# Patient Record
(demographics unavailable — no encounter records)

---

## 2024-10-08 NOTE — HISTORY OF PRESENT ILLNESS
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 Being followed by , Dr. Gilliland and Vascular, . Performing LWC with betadine paint intermittently, cleaning with soap and water.  Pt now presents for evaluation of new right ring finger swelling, pain and mild drainage over the last 3 days. Pt had a later appt however was recommended to f/u asap after going to hand OT earlier this week. Pt denies any fever/chills or systemic symptoms. Reports RRF was spontaneously bleeding this week, denies purulent drainage.   Here with both daughters  Non smoker No h/o DM Not on any anticoagulation  Interval hx (4/16/24). Pt here for f/u accompanied by daughter. Doing well with improvement in healing. BL hand X-Ray with diffuse OA, no evidence of OM. Taking oral antibiotics, stopped 3 days ago due to GI discomfort. Denies any f/c or purulent drainage. Performing daily Betadine paint.   Interval hx (7/25/24). Pt here for f/u accompanied by daughter. No significant change. Offers no new complaints. Applying daily Betadine to ischemic digits. Denies any f/c or purulent drainage or gangrene.   Interval hx (9/6/24). Pt here for f/u to necrotic fingertips accompanied by daughter. Recently admitted to [facility] for UTI, on oral antibiotics for 2 more days. Noticed purulent drainage and increasing pain left middle fingertip for 2 days. Left index and right 4th finger dry gangrene stable. Denies any fever or chills.   Interval hx (9/25/24) : POD#7 s/p amputation of L index distal phalax, L 3rd finger distal phalax & R 4th finger distal phalax. Complaining of pain mainly in the L hand, took tramadol post op with relief for 2 days. Denies fever or drainage from the dressings.  Interval hx (10/2/24): POD#14 s/p amputation of L index distal phalax, L 3rd finger distal phalax & R 4th finger distal phalax. Complaining of pain to both fingers. Denies fever or drainage from the dressings

## 2024-10-08 NOTE — PHYSICAL EXAM
[de-identified] : Well developed, well nourished in NAD  [de-identified] : nonlabored breathing  [de-identified] : Right hand- warm with good ROM, RF with small open area of granulation tissue near amputation site, sutures in place, healing well, no drainage or purulent drainage, mild edema, mild tenderness.  Left hand - warm with good ROM, 2nd and 3rd digits with amputation site at distal phalax, sutures in place. 2nd digit with area of bright green purulence and blister, severe tenderness to touch, incision opening up with no purulence but yellow eschar present. 3rd digit healing well, incision closed with no drainage, mild tenderness

## 2024-10-08 NOTE — ASSESSMENT
[FreeTextEntry1] : 83 y/o [place] citizen-speaking female here for evaluation of bilateral hand pressor induced digital gangrene, improving and demarcating dry gangrene. Patient and daughter agreed to amputation   POD#14 s/p amputation of L index distal phalax, L 3rd finger distal phalax & R 4th finger distal phalax.   - sutures removed from all incisions - L 2nd finger wrapped with baci/tracin/xeroform/cling/coban. to stay until next visit. 2 sutures remain in place - R finger & L 3rd finger with bacitracin/xeroform/cling/coban. Change every other day  - 1 week augmentin sent for finger infection - plan for surgery with podiatry tomorrow for toe amputation - f/u 1 week with  for evaluation of L 2nd finger infection  10/8/2024 as above doing well finger fine changed abx to doxy based on sensitivities seen w daughter remaining sutures remvoed  f/u 2 wks

## 2024-10-10 NOTE — PHYSICAL EXAM
[General Appearance - Alert] : alert [Ankle Swelling (On Exam)] : not present [Ankle Swelling On The Right] : of the right ankle [Delayed in the Right Toes] : capillary refills normal in right toes [2+] : right foot dorsalis pedis 2+ [de-identified] : Normal ROM.  [FreeTextEntry1] : Suture sites intact at Right Hallux Distal Amputation site, no signs of dehiscence, minimal swellling [Sensation] : the sensory exam was normal to light touch and pinprick

## 2024-10-10 NOTE — ASSESSMENT
[FreeTextEntry1] : s/p  Right Distal Hallux Amputation  Questions and concerns were addressed.  New dressing of xeroform- Gauze- Abdominal Pad- kerlix- Ace bandage.  RTC next week.

## 2024-10-10 NOTE — HISTORY OF PRESENT ILLNESS
[FreeTextEntry1] : Patient presents to clinic for post-op follow up of Right Distal Hallux amputation with Tissue plasty done last week. Patient was assisted to clinic with a wheelchair. Patient came to clinic with post-op shoe.

## 2024-10-15 NOTE — HISTORY OF PRESENT ILLNESS
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 Being followed by , Dr. Gilliland and Vascular, . Performing LWC with betadine paint intermittently, cleaning with soap and water.  Pt now presents for evaluation of new right ring finger swelling, pain and mild drainage over the last 3 days. Pt had a later appt however was recommended to f/u asap after going to hand OT earlier this week. Pt denies any fever/chills or systemic symptoms. Reports RRF was spontaneously bleeding this week, denies purulent drainage.   Here with both daughters  Non smoker No h/o DM Not on any anticoagulation  Interval hx (4/16/24). Pt here for f/u accompanied by daughter. Doing well with improvement in healing. BL hand X-Ray with diffuse OA, no evidence of OM. Taking oral antibiotics, stopped 3 days ago due to GI discomfort. Denies any f/c or purulent drainage. Performing daily Betadine paint.   Interval hx (7/25/24). Pt here for f/u accompanied by daughter. No significant change. Offers no new complaints. Applying daily Betadine to ischemic digits. Denies any f/c or purulent drainage or gangrene.   Interval hx (9/6/24). Pt here for f/u to necrotic fingertips accompanied by daughter. Recently admitted to [facility] for UTI, on oral antibiotics for 2 more days. Noticed purulent drainage and increasing pain left middle fingertip for 2 days. Left index and right 4th finger dry gangrene stable. Denies any fever or chills.   Interval hx (9/25/24) : POD#7 s/p amputation of L index distal phalanx, L 3rd finger distal phalanx & R 4th finger distal phalanx. Complaining of pain mainly in the L hand, took tramadol post op with relief for 2 days. Denies fever or drainage from the dressings.  Interval hx (10/2/24): POD#14 s/p amputation of L index distal phalanx, L 3rd finger distal phalanx & R 4th finger distal phalanx. Complaining of pain to both fingers. Denies fever or drainage from the dressings  Interval hx (10/15/24): Pt presents today 4 weeks s/p distal amputation of L index, L middle & R 4th finger distal phalanx concerned with healing and pain. Completed all oral antibiotics. Denies any f/c or drainage.

## 2024-10-15 NOTE — DATA REVIEWED
[FreeTextEntry1] : Steven Accession Number : [id number] Patient:     REYNA WYATT   Accession:                             [id number]  Collected Date/Time:                   9/18/2024 11:22 EDT Received Date/Time:                    9/18/2024 13:57 EDT  Surgical Pathology Report - Auth (Verified)  Specimen(s) Submitted 1  Left  index distal phalanx 2  Left third finger distal phalanx 3  Right fourth finger distal phalanx  Final Diagnosis 1.  Left index distal phalanx, amputation: -  Gangrenous digit.   2.  Left third finger distal phalanx, amputation: -  Gangrenous digit.  3.  Right fourth finger distal phalanx, amputation: -  Gangrenous digit. Verified by: Krysten Juan M.D. (Electronic Signature) Reported on: 09/23/24 12:28 EDT, [facility], [address] [place] [address] Phone: [phone]   Fax: [phone]

## 2024-10-15 NOTE — PHYSICAL EXAM
[de-identified] : Well developed, well nourished in NAD  [de-identified] : nonlabored breathing  [de-identified] : Right hand- warm with good ROM, RF healing well, no drainage or purulent drainage, no erythema or open wounds Left hand - warm with good ROM, 2nd and 3rd digits with amputation stump at middle phalanx, 2nd digit with small dry eschar and slight skin discoloration, dorsal aspect with minor epidermolysis, no erythema or purulence, 3rd digit healing well, incision closed with no drainage, mild tenderness

## 2024-10-15 NOTE — ASSESSMENT
[FreeTextEntry1] : 83 y/o Romanian-speaking female here for evaluation of bilateral hand pressor induced digital gangrene, improving and demarcating dry gangrene. Patient and daughter agreed to amputation   Now 4 weeks s/p amputation of L index distal phalanx, L 3rd finger distal phalanx & R 4th finger distal phalanx. Doing well.   - daily Aquaphor to all amputation stumps - may shower, no submerging  - hand elevation  - all her questions were answered  - f/u 2 weeks

## 2024-10-18 NOTE — ASSESSMENT
[FreeTextEntry1] : Subjective:   - Summary: The patient presented for removal of sutures and wound care.   - Chief Complaint (CC): Suture removal and wound care.   - History of Present Illness (HPI): The patient had sutures that needed to be removed and required wound care.   - Past Medical History:   - Past Surgical History:   - Family History:   - Social History:   - Review of Systems:   - Medications:   - Allergies:   Objective:   - Diagnostic Results:   - Vital Signs:   - Physical Examination (PE): The patient had sutures that needed to be removed and a wound that required care.   Assessment:   - Summary: The patient required suture removal and wound care left foot   - Problems:     - Sutures requiring removal     - Wound care needed    Plan:   - Summary: The plan was to remove the sutures and provide wound care instructions.   - Plan:     - Suture removal left foot      - Wound care instructions given and discussed possible complications of amputation and tissue plasty     - Follow-up in 2 weeks to assess wound healing

## 2024-10-29 NOTE — DATA REVIEWED
[FreeTextEntry1] : Steven Accession Number : [id number] Patient:     REYNA WYATT   Accession:                             [id number]  Collected Date/Time:                   9/18/2024 11:22 EDT Received Date/Time:                    9/18/2024 13:57 EDT  Surgical Pathology Report - Auth (Verified)  Specimen(s) Submitted 1  Left  index distal phalanx 2  Left third finger distal phalanx 3  Right fourth finger distal phalanx  Final Diagnosis 1.  Left index distal phalanx, amputation: -  Gangrenous digit.   2.  Left third finger distal phalanx, amputation: -  Gangrenous digit.  3.  Right fourth finger distal phalanx, amputation: -  Gangrenous digit. Verified by: Krysten Juan M.D. (Electronic Signature) Reported on: 09/23/24 12:28 EDT, [facility], [address] [place] [address] Phone: [phone]   Fax: [phone]

## 2024-10-29 NOTE — PHYSICAL EXAM
[de-identified] : Well developed, well nourished in NAD  [de-identified] : nonlabored breathing  [de-identified] : Right hand- warm with good ROM, ring finger amputation stump healed, no erythema or purulent drainage, hypersensitive scar  Left hand - warm with good ROM, 2nd and 3rd digits with amputation stump at middle phalanx, 2nd digit with small dry eschar, no erythema or purulence, 3rd digit healed, no erythema, no drainage, hypersensitive scar

## 2024-10-29 NOTE — ASSESSMENT
[FreeTextEntry1] : 83 y/o Persian-speaking female here for evaluation of bilateral hand pressor induced digital gangrene, improving and demarcating dry gangrene. Patient and daughter agreed to amputation   Now 6 weeks s/p amputation of L index distal phalanx, L 3rd finger distal phalanx & R 4th finger distal phalanx. Doing well.   - daily Aquaphor to all amputation stumps - scar massage as demonstrated - Rx Gabapentin for pain PRN  - OHT for ROM and scar desensitization, referral given  - hand elevation  - all her questions were answered  - f/u 2 months with Dr. Arreaga.

## 2024-10-29 NOTE — HISTORY OF PRESENT ILLNESS
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 Being followed by , Dr. Gilliland and Vascular, . Performing LWC with betadine paint intermittently, cleaning with soap and water.  Pt now presents for evaluation of new right ring finger swelling, pain and mild drainage over the last 3 days. Pt had a later appt however was recommended to f/u asap after going to hand OT earlier this week. Pt denies any fever/chills or systemic symptoms. Reports RRF was spontaneously bleeding this week, denies purulent drainage.   Here with both daughters  Non smoker No h/o DM Not on any anticoagulation  Interval hx (4/16/24). Pt here for f/u accompanied by daughter. Doing well with improvement in healing. BL hand X-Ray with diffuse OA, no evidence of OM. Taking oral antibiotics, stopped 3 days ago due to GI discomfort. Denies any f/c or purulent drainage. Performing daily Betadine paint.   Interval hx (7/25/24). Pt here for f/u accompanied by daughter. No significant change. Offers no new complaints. Applying daily Betadine to ischemic digits. Denies any f/c or purulent drainage or gangrene.   Interval hx (9/6/24). Pt here for f/u to necrotic fingertips accompanied by daughter. Recently admitted to [facility] for UTI, on oral antibiotics for 2 more days. Noticed purulent drainage and increasing pain left middle fingertip for 2 days. Left index and right 4th finger dry gangrene stable. Denies any fever or chills.   Interval hx (9/25/24) : POD#7 s/p amputation of L index distal phalanx, L 3rd finger distal phalanx & R 4th finger distal phalanx. Complaining of pain mainly in the L hand, took tramadol post op with relief for 2 days. Denies fever or drainage from the dressings.  Interval hx (10/2/24): POD#14 s/p amputation of L index distal phalanx, L 3rd finger distal phalanx & R 4th finger distal phalanx. Complaining of pain to both fingers. Denies fever or drainage from the dressings  Interval hx (10/15/24): Pt presents today 4 weeks s/p distal amputation of L index, L middle & R 4th finger distal phalanx concerned with healing and pain. Completed all oral antibiotics. Denies any f/c or drainage.   Interval hx (10/29/24): Pt presents today 6 weeks s/p distal amputation of L index, L middle & R 4th finger distal phalanx. Doing very well overall but concerned with sensitive scar and discomfort.

## 2024-11-04 NOTE — PHYSICAL EXAM
[de-identified] : S/p distal hallux amputation with skin plasty, RF [FreeTextEntry1] : Surgical site well healed, no signs of infeciton or openings.

## 2024-11-04 NOTE — HISTORY OF PRESENT ILLNESS
[FreeTextEntry1] : Patient is 84 yr/o female who presents to clinic for post operative evaluation  -s/p Distal hallux amputation with skin plasty  -Wearing surgical shoe.  -Denies any increase in pain, redness, swelling or drainage.  -Does complain of pain to distal part when trying to wear normal shoe

## 2024-11-04 NOTE — ASSESSMENT
[FreeTextEntry1] : S/p Distal Hallux Amputation with Skin Plasty, Right Foot  - Healed  -Patient seen and evaluated today in clinic with all questions and concerns addressed and answered.  -Rx script for custom shoes physical therapy as well for gait training difficulty walking due to amputation site may require assistive device -Discussed all complications with patient and patient's family member -Reviewed x-ray and lab work with patient as well patient is in agreement and understands treatment plan -Patient to return to clinic in one month

## 2024-11-04 NOTE — PHYSICAL EXAM
[de-identified] : S/p distal hallux amputation with skin plasty, RF [FreeTextEntry1] : Surgical site well healed, no signs of infeciton or openings.

## 2024-11-04 NOTE — PHYSICAL EXAM
[de-identified] : S/p distal hallux amputation with skin plasty, RF [FreeTextEntry1] : Surgical site well healed, no signs of infeciton or openings.

## 2024-11-25 NOTE — ASSESSMENT
[FreeTextEntry1] : S/p Distal Hallux Amputation with Skin Plasty, Right Foot  - Healed Discussed with patient skin scraping results and culture Discussed oral treatments versus topical At this time due to patient's age and past medical history patient's family wish for treatment with topical Prescriptions as below Patient can follow-up in 2 weeks

## 2024-11-25 NOTE — PHYSICAL EXAM
[de-identified] : S/p distal hallux amputation with skin plasty, RF [FreeTextEntry1] : Surgical site well healed, no signs of infeciton or openings. hyperkeratotic lesion on the plantar left foot  [Oriented To Time, Place, And Person] : oriented to person, place, and time

## 2024-11-25 NOTE — PHYSICAL EXAM
[de-identified] : S/p distal hallux amputation with skin plasty, RF [FreeTextEntry1] : Surgical site well healed, no signs of infeciton or openings. hyperkeratotic lesion on the plantar left foot  [Oriented To Time, Place, And Person] : oriented to person, place, and time

## 2024-11-29 NOTE — PHYSICAL EXAM
[de-identified] : S/p distal hallux amputation with skin plasty, RF [FreeTextEntry1] : Surgical site well healed, no signs of infeciton or openings. hyperkeratotic lesion on the plantar left foot  [Oriented To Time, Place, And Person] : oriented to person, place, and time

## 2024-11-29 NOTE — ASSESSMENT
[FreeTextEntry1] : S/p Distal Hallux Amputation with Skin Plasty, Right Foot  - Healed  -Patient seen and evaluated today in clinic with all questions and concerns addressed and answered.   - Tissue biopsy and culture taken from plantar left foot  -Patient to return to clinic in one week

## 2024-11-29 NOTE — PHYSICAL EXAM
[de-identified] : S/p distal hallux amputation with skin plasty, RF [FreeTextEntry1] : Surgical site well healed, no signs of infeciton or openings. hyperkeratotic lesion on the plantar left foot  [Oriented To Time, Place, And Person] : oriented to person, place, and time

## 2024-11-29 NOTE — HISTORY OF PRESENT ILLNESS
Tiffanie Evans(Resident) [FreeTextEntry1] : Patient is 84 yr/o female who presents to clinic for post operative evaluation  -s/p Distal hallux amputation with skin plasty  -Wearing surgical shoe.  -Denies any increase in pain, redness, swelling or drainage.  -Does complain of pain to distal part when trying to wear normal shoe

## 2024-12-17 NOTE — ASSESSMENT
[FreeTextEntry1] : 83 y/o Luxembourger-speaking female here for evaluation of bilateral hand pressor induced digital gangrene, improving and demarcating dry gangrene. Patient and daughter agreed to amputation   Now 6 weeks s/p amputation of L index distal phalanx, L 3rd finger distal phalanx & R 4th finger distal phalanx. Doing well.   - daily Aquaphor to all amputation stumps - scar massage as demonstrated - Rx Gabapentin for pain PRN  - OHT for ROM and scar desensitization, referral given  - hand elevation  - all her questions were answered  - f/u 2 months with Dr. Arreaga.   12/17/2024 seen w daughters left hand amputation sites--third finegr fine, index w 2mm x 1mm eschar daily aquafor  has right distal finggrtip pain in index and fourth fingers  cont Hand OT  conatct info given for digital prosthesis--Camilo

## 2024-12-17 NOTE — DATA REVIEWED
[FreeTextEntry1] : Steven Accession Number : [id number] Patient:     REYNA WYATT   Accession:                             [id number]  Collected Date/Time:                   9/18/2024 11:22 EDT Received Date/Time:                    9/18/2024 13:57 EDT  Surgical Pathology Report - Auth (Verified)  Specimen(s) Submitted 1  Left  index distal phalanx 2  Left third finger distal phalanx 3  Right fourth finger distal phalanx  Final Diagnosis 1.  Left index distal phalanx, amputation: -  Gangrenous digit.   2.  Left third finger distal phalanx, amputation: -  Gangrenous digit.  3.  Right fourth finger distal phalanx, amputation: -  Gangrenous digit. Verified by: Krysten Juan M.D. (Electronic Signature) Reported on: 09/23/24 12:28 EDT, [facility], [address] [place] [address] Phone: [phone]   Fax: [phone]

## 2024-12-17 NOTE — PHYSICAL EXAM
[de-identified] : Well developed, well nourished in NAD  [de-identified] : nonlabored breathing  [de-identified] : Right hand- warm with good ROM, ring finger amputation stump healed, no erythema or purulent drainage, hypersensitive scar  Left hand - warm with good ROM, 2nd and 3rd digits with amputation stump at middle phalanx, 2nd digit with small dry eschar, no erythema or purulence, 3rd digit healed, no erythema, no drainage, hypersensitive scar

## 2024-12-17 NOTE — HISTORY OF PRESENT ILLNESS
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 Being followed by , Dr. Gilliland and Vascular, . Performing LWC with betadine paint intermittently, cleaning with soap and water.  Pt now presents for evaluation of new right ring finger swelling, pain and mild drainage over the last 3 days. Pt had a later appt however was recommended to f/u asap after going to hand OT earlier this week. Pt denies any fever/chills or systemic symptoms. Reports RRF was spontaneously bleeding this week, denies purulent drainage.   Here with both daughters  Non smoker No h/o DM Not on any anticoagulation  Interval hx (4/16/24). Pt here for f/u accompanied by daughter. Doing well with improvement in healing. BL hand X-Ray with diffuse OA, no evidence of OM. Taking oral antibiotics, stopped 3 days ago due to GI discomfort. Denies any f/c or purulent drainage. Performing daily Betadine paint.   Interval hx (7/25/24). Pt here for f/u accompanied by daughter. No significant change. Offers no new complaints. Applying daily Betadine to ischemic digits. Denies any f/c or purulent drainage or gangrene.   Interval hx (9/6/24). Pt here for f/u to necrotic fingertips accompanied by daughter. Recently admitted to [facility] for UTI, on oral antibiotics for 2 more days. Noticed purulent drainage and increasing pain left middle fingertip for 2 days. Left index and right 4th finger dry gangrene stable. Denies any fever or chills.   Interval hx (9/25/24) : POD#7 s/p amputation of L index distal phalanx, L 3rd finger distal phalanx & R 4th finger distal phalanx. Complaining of pain mainly in the L hand, took tramadol post op with relief for 2 days. Denies fever or drainage from the dressings.  Interval hx (10/2/24): POD#14 s/p amputation of L index distal phalanx, L 3rd finger distal phalanx & R 4th finger distal phalanx. Complaining of pain to both fingers. Denies fever or drainage from the dressings  Interval hx (10/15/24): Pt presents today 4 weeks s/p distal amputation of L index, L middle & R 4th finger distal phalanx concerned with healing and pain. Completed all oral antibiotics. Denies any f/c or drainage.   Interval hx (10/29/24): Pt presents today 6 weeks s/p distal amputation of L index, L middle & R 4th finger distal phalanx. Doing very well overall but concerned with sensitive scar and discomfort.

## 2024-12-23 NOTE — PHYSICAL EXAM
[General Appearance - Alert] : alert [General Appearance - In No Acute Distress] : in no acute distress [1+] : right foot dorsalis pedis 1+ [Skin Color & Pigmentation] : normal skin color and pigmentation [Oriented To Time, Place, And Person] : oriented to person, place, and time [Delayed in the Right Toes] : capillary refills normal in right toes [Delayed in the Left Toes] : capillary refills normal in the left toes [de-identified] : S/p distal hallux amputation with skin plasty, RF [FreeTextEntry1] : Surgical site well healed, no signs of infeciton or openings. Fissure at the dorsal RF hallux at surgical site w/o open wound or signs of infection [Diminished Throughout Right Foot] : normal sensation with monofilament testing throughout right foot [Diminished Throughout Left Foot] : normal sensation with monofilament testing throughout left foot

## 2024-12-23 NOTE — HISTORY OF PRESENT ILLNESS
[FreeTextEntry1] : Patient is 84 yr/o female who presents to clinic for post operative evaluation  -s/p Distal hallux amputation with skin plasty  -Denies any increase in pain, redness, swelling or drainage.  -Does complain of pain to distal part when trying to wear normal shoe  -Fissue at the affected area with some pain RF

## 2024-12-23 NOTE — PHYSICAL EXAM
[General Appearance - Alert] : alert [General Appearance - In No Acute Distress] : in no acute distress [1+] : right foot dorsalis pedis 1+ [Skin Color & Pigmentation] : normal skin color and pigmentation [Oriented To Time, Place, And Person] : oriented to person, place, and time [Delayed in the Right Toes] : capillary refills normal in right toes [Delayed in the Left Toes] : capillary refills normal in the left toes [de-identified] : S/p distal hallux amputation with skin plasty, RF [FreeTextEntry1] : Surgical site well healed, no signs of infeciton or openings. Fissure at the dorsal RF hallux at surgical site w/o open wound or signs of infection [Diminished Throughout Right Foot] : normal sensation with monofilament testing throughout right foot [Diminished Throughout Left Foot] : normal sensation with monofilament testing throughout left foot

## 2024-12-23 NOTE — ASSESSMENT
[FreeTextEntry1] : S/p Distal Hallux Amputation with Skin Plasty, Right Foot  - Healed  Recommend aquaphor or vaseline to fissure Continue topical antifungals as needed to plantar RF Continue moisturizing dry areas as needed Patient can follow-up in 2 months

## 2024-12-27 NOTE — PHYSICAL EXAM
[de-identified] : Well developed, well nourished in NAD  [de-identified] : nonlabored breathing  [de-identified] : Right hand- warm with good ROM, ring finger amputation stump healed, no erythema or purulent drainage, hypersensitive scar.  FIRST DIGIT with TTP to distal phalanx, mild swelling, no erythema.  no expressible drainage or active drainage noted.  good cap refill.  Left hand - warm with good ROM, 2nd and 3rd digits with amputation stump at middle phalanx, healed, no erythema or purulence

## 2024-12-27 NOTE — HISTORY OF PRESENT ILLNESS
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 Being followed by , Dr. Gilliland and Vascular, . Performing LWC with betadine paint intermittently, cleaning with soap and water.  Pt now presents for evaluation of new right ring finger swelling, pain and mild drainage over the last 3 days. Pt had a later appt however was recommended to f/u asap after going to hand OT earlier this week. Pt denies any fever/chills or systemic symptoms. Reports RRF was spontaneously bleeding this week, denies purulent drainage.   Here with both daughters  Non smoker No h/o DM Not on any anticoagulation  Interval hx (4/16/24). Pt here for f/u accompanied by daughter. Doing well with improvement in healing. BL hand X-Ray with diffuse OA, no evidence of OM. Taking oral antibiotics, stopped 3 days ago due to GI discomfort. Denies any f/c or purulent drainage. Performing daily Betadine paint.   Interval hx (7/25/24). Pt here for f/u accompanied by daughter. No significant change. Offers no new complaints. Applying daily Betadine to ischemic digits. Denies any f/c or purulent drainage or gangrene.   Interval hx (9/6/24). Pt here for f/u to necrotic fingertips accompanied by daughter. Recently admitted to [facility] for UTI, on oral antibiotics for 2 more days. Noticed purulent drainage and increasing pain left middle fingertip for 2 days. Left index and right 4th finger dry gangrene stable. Denies any fever or chills.   Interval hx (9/25/24) : POD#7 s/p amputation of L index distal phalanx, L 3rd finger distal phalanx & R 4th finger distal phalanx. Complaining of pain mainly in the L hand, took tramadol post op with relief for 2 days. Denies fever or drainage from the dressings.  Interval hx (10/2/24): POD#14 s/p amputation of L index distal phalanx, L 3rd finger distal phalanx & R 4th finger distal phalanx. Complaining of pain to both fingers. Denies fever or drainage from the dressings  Interval hx (10/15/24): Pt presents today 4 weeks s/p distal amputation of L index, L middle & R 4th finger distal phalanx concerned with healing and pain. Completed all oral antibiotics. Denies any f/c or drainage.   Interval hx (10/29/24): Pt presents today 6 weeks s/p distal amputation of L index, L middle & R 4th finger distal phalanx. Doing very well overall but concerned with sensitive scar and discomfort.   Interval Hx (12/27/24): Patient presents with daughters for evaluation of right index finger pain/swelling that started on 12/22.  States she went to Urgent care and prescribed Bactrim DS and Keflex, no incision and drainage performed but with manual expression, pus was extracted.  Feels as though swelling and pain has improved, denies worsening erythema, fevers, chills or similar symptoms in other digits.  Has been keeping finger covered, not performing warm compresses or soaks.

## 2024-12-27 NOTE — ASSESSMENT
[FreeTextEntry1] : 83 y/o Kuwaiti-speaking female here for evaluation of bilateral hand pressor induced digital gangrene, improving and demarcating dry gangrene. Patient and daughter agreed to amputation   Now 6 weeks s/p amputation of L index distal phalanx, L 3rd finger distal phalanx & R 4th finger distal phalanx. Doing well.   - daily Aquaphor to all amputation stumps - scar massage as demonstrated - Rx Gabapentin for pain PRN  - OHT for ROM and scar desensitization, referral given  - hand elevation  - all her questions were answered  - f/u 2 months with Dr. Arreaga.   12/17/2024 seen w daughters left hand amputation sites--third finegr fine, index w 2mm x 1mm eschar daily aquafor  has right distal finggrtip pain in index and fourth fingers  cont Hand OT  conatct info given for digital prosthesis--Arimed   12/27/24 : here for evaluation of right index finger pain/swelling  - continue keflex and bactrim as prescribed  - perform warm water soaks 3x daily  - avoid cutting nails, advised to use nail file instead.  - cont hand OT  - follow up x 2 months with Dr. Arreaga as scheduled, call for sooner appointment should any symptoms worsen.

## 2025-02-21 NOTE — PHYSICAL EXAM
[General Appearance - Alert] : alert [General Appearance - In No Acute Distress] : in no acute distress [Delayed in the Right Toes] : capillary refills normal in right toes [Delayed in the Left Toes] : capillary refills normal in the left toes [1+] : right foot dorsalis pedis 1+ [de-identified] : S/p distal hallux amputation with skin plasty, RF [Skin Color & Pigmentation] : normal skin color and pigmentation [FreeTextEntry1] : Surgical site well healed, no signs of infection or openings.  [Diminished Throughout Right Foot] : normal sensation with monofilament testing throughout right foot [Diminished Throughout Left Foot] : normal sensation with monofilament testing throughout left foot [Oriented To Time, Place, And Person] : oriented to person, place, and time

## 2025-02-21 NOTE — ASSESSMENT
[FreeTextEntry1] : S/p Distal Hallux Amputation with Skin Plasty, Right Foot  - Healed  Recommend Aquaphor or Vaseline to fissure Continue moisturizing dry areas as needed Patient can follow-up in 6 months

## 2025-02-27 NOTE — PHYSICAL EXAM
[de-identified] : Well developed, well nourished in NAD  [de-identified] : nonlabored breathing  [de-identified] : Right hand- warm with good ROM, ring finger amputation stump healed, no erythema or purulent drainage, hypersensitive scar.  FIRST DIGIT with TTP to distal phalanx, mild swelling, no erythema.  no expressible drainage or active drainage noted.  good cap refill.  Left hand - warm with good ROM, 2nd and 3rd digits with amputation stump at middle phalanx, healed, no erythema or purulence

## 2025-02-27 NOTE — ASSESSMENT
[FreeTextEntry1] : 83 y/o Israeli-speaking female here for evaluation of bilateral hand pressor induced digital gangrene, improving and demarcating dry gangrene. Patient and daughter agreed to amputation   Now 6 weeks s/p amputation of L index distal phalanx, L 3rd finger distal phalanx & R 4th finger distal phalanx. Doing well.   - daily Aquaphor to all amputation stumps - scar massage as demonstrated - Rx Gabapentin for pain PRN  - OHT for ROM and scar desensitization, referral given  - hand elevation  - all her questions were answered  - f/u 2 months with Dr. Arreaga.   12/17/2024 seen w daughters left hand amputation sites--third finegr fine, index w 2mm x 1mm eschar daily aquafor  has right distal finggrtip pain in index and fourth fingers  cont Hand OT  conatct info given for digital prosthesis--Arimed   12/27/24 : here for evaluation of right index finger pain/swelling  - continue keflex and bactrim as prescribed  - perform warm water soaks 3x daily  - avoid cutting nails, advised to use nail file instead.  - cont hand OT  - follow up x 2 months with Dr. Arreaga as scheduled, call for sooner appointment should any symptoms worsen.   2/27/2025 as above doing well s/p distal amp left 2+3 well healed mild fungal infection right 2+4  topical anmtifungal perscribed f/u 3 months  seen w both daughters

## 2025-02-27 NOTE — HISTORY OF PRESENT ILLNESS
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 Being followed by , Dr. Gilliland and Vascular, . Performing LWC with betadine paint intermittently, cleaning with soap and water.  Pt now presents for evaluation of new right ring finger swelling, pain and mild drainage over the last 3 days. Pt had a later appt however was recommended to f/u asap after going to hand OT earlier this week. Pt denies any fever/chills or systemic symptoms. Reports RRF was spontaneously bleeding this week, denies purulent drainage.   Here with both daughters  Non smoker No h/o DM Not on any anticoagulation  Interval hx (4/16/24). Pt here for f/u accompanied by daughter. Doing well with improvement in healing. BL hand X-Ray with diffuse OA, no evidence of OM. Taking oral antibiotics, stopped 3 days ago due to GI discomfort. Denies any f/c or purulent drainage. Performing daily Betadine paint.   Interval hx (7/25/24). Pt here for f/u accompanied by daughter. No significant change. Offers no new complaints. Applying daily Betadine to ischemic digits. Denies any f/c or purulent drainage or gangrene.   Interval hx (9/6/24). Pt here for f/u to necrotic fingertips accompanied by daughter. Recently admitted to [facility] for UTI, on oral antibiotics for 2 more days. Noticed purulent drainage and increasing pain left middle fingertip for 2 days. Left index and right 4th finger dry gangrene stable. Denies any fever or chills.   Interval hx (9/25/24) : POD#7 s/p amputation of L index distal phalanx, L 3rd finger distal phalanx & R 4th finger distal phalanx. Complaining of pain mainly in the L hand, took tramadol post op with relief for 2 days. Denies fever or drainage from the dressings.  Interval hx (10/2/24): POD#14 s/p amputation of L index distal phalanx, L 3rd finger distal phalanx & R 4th finger distal phalanx. Complaining of pain to both fingers. Denies fever or drainage from the dressings  Interval hx (10/15/24): Pt presents today 4 weeks s/p distal amputation of L index, L middle & R 4th finger distal phalanx concerned with healing and pain. Completed all oral antibiotics. Denies any f/c or drainage.   Interval hx (10/29/24): Pt presents today 6 weeks s/p distal amputation of L index, L middle & R 4th finger distal phalanx. Doing very well overall but concerned with sensitive scar and discomfort.   Interval Hx (12/27/24): Patient presents with daughters for evaluation of right index finger pain/swelling that started on 12/22.  States she went to Urgent care and prescribed Bactrim DS and Keflex, no incision and drainage performed but with manual expression, pus was extracted.  Feels as though swelling and pain has improved, denies worsening erythema, fevers, chills or similar symptoms in other digits.  Has been keeping finger covered, not performing warm compresses or soaks.

## 2025-03-11 NOTE — ASSESSMENT
[FreeTextEntry1] : 83 y/o Icelandic-speaking female here for evaluation of bilateral hand pressor induced digital gangrene, improving and demarcating dry gangrene. Patient and daughter agreed to amputation   Now 6 weeks s/p amputation of L index distal phalanx, L 3rd finger distal phalanx & R 4th finger distal phalanx. Doing well.   - daily Aquaphor to all amputation stumps - scar massage as demonstrated - Rx Gabapentin for pain PRN  - OHT for ROM and scar desensitization, referral given  - hand elevation  - all her questions were answered  - f/u 2 months with Dr. Arreaga.   12/17/2024 seen w daughters left hand amputation sites--third finegr fine, index w 2mm x 1mm eschar daily aquafor  has right distal finggrtip pain in index and fourth fingers  cont Hand OT  conatct info given for digital prosthesis--Arimed   12/27/24 : here for evaluation of right index finger pain/swelling  - continue keflex and bactrim as prescribed  - perform warm water soaks 3x daily  - avoid cutting nails, advised to use nail file instead.  - cont hand OT  - follow up x 2 months with Dr. Arreaga as scheduled, call for sooner appointment should any symptoms worsen.   2/27/2025 as above doing well s/p distal amp left 2+3 well healed mild fungal infection right 2+4  topical anmtifungal perscribed f/u 3 months  seen w both daughters    3/11/2025 pt called me last pm--daughter did--given keflex here in f/u given script for keflex 500 WID---I suggested change to TID  gives h/o trauma to right index finger  xray  f/u w PA in one week

## 2025-03-11 NOTE — HISTORY OF PRESENT ILLNESS
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 Being followed by , Dr. Gilliland and Vascular, . Performing LWC with betadine paint intermittently, cleaning with soap and water.  Pt now presents for evaluation of new right ring finger swelling, pain and mild drainage over the last 3 days. Pt had a later appt however was recommended to f/u asap after going to hand OT earlier this week. Pt denies any fever/chills or systemic symptoms. Reports RRF was spontaneously bleeding this week, denies purulent drainage.   Here with both daughters  Non smoker No h/o DM Not on any anticoagulation  Interval hx (4/16/24). Pt here for f/u accompanied by daughter. Doing well with improvement in healing. BL hand X-Ray with diffuse OA, no evidence of OM. Taking oral antibiotics, stopped 3 days ago due to GI discomfort. Denies any f/c or purulent drainage. Performing daily Betadine paint.   Interval hx (7/25/24). Pt here for f/u accompanied by daughter. No significant change. Offers no new complaints. Applying daily Betadine to ischemic digits. Denies any f/c or purulent drainage or gangrene.   Interval hx (9/6/24). Pt here for f/u to necrotic fingertips accompanied by daughter. Recently admitted to [facility] for UTI, on oral antibiotics for 2 more days. Noticed purulent drainage and increasing pain left middle fingertip for 2 days. Left index and right 4th finger dry gangrene stable. Denies any fever or chills.   Interval hx (9/25/24) : POD#7 s/p amputation of L index distal phalanx, L 3rd finger distal phalanx & R 4th finger distal phalanx. Complaining of pain mainly in the L hand, took tramadol post op with relief for 2 days. Denies fever or drainage from the dressings.  Interval hx (10/2/24): POD#14 s/p amputation of L index distal phalanx, L 3rd finger distal phalanx & R 4th finger distal phalanx. Complaining of pain to both fingers. Denies fever or drainage from the dressings  Interval hx (10/15/24): Pt presents today 4 weeks s/p distal amputation of L index, L middle & R 4th finger distal phalanx concerned with healing and pain. Completed all oral antibiotics. Denies any f/c or drainage.   Interval hx (10/29/24): Pt presents today 6 weeks s/p distal amputation of L index, L middle & R 4th finger distal phalanx. Doing very well overall but concerned with sensitive scar and discomfort.   Interval Hx (12/27/24): Patient presents with daughters for evaluation of right index finger pain/swelling that started on 12/22.  States she went to Urgent care and prescribed Bactrim DS and Keflex, no incision and drainage performed but with manual expression, pus was extracted.  Feels as though swelling and pain has improved, denies worsening erythema, fevers, chills or similar symptoms in other digits.  Has been keeping finger covered, not performing warm compresses or soaks.

## 2025-03-11 NOTE — PHYSICAL EXAM
[de-identified] : Well developed, well nourished in NAD  [de-identified] : nonlabored breathing  [de-identified] : Right hand- warm with good ROM, ring finger amputation stump healed, no erythema or purulent drainage, hypersensitive scar.  FIRST DIGIT with TTP to distal phalanx, mild swelling, no erythema.  no expressible drainage or active drainage noted.  good cap refill.  Left hand - warm with good ROM, 2nd and 3rd digits with amputation stump at middle phalanx, healed, no erythema or purulence

## 2025-03-18 NOTE — ASSESSMENT
[FreeTextEntry1] : 83 y/o Cambodian-speaking female here for evaluation of bilateral hand pressor induced digital gangrene, improving and demarcating dry gangrene. Patient and daughter agreed to amputation   Now 6 weeks s/p amputation of L index distal phalanx, L 3rd finger distal phalanx & R 4th finger distal phalanx. Doing well.   - daily Aquaphor to all amputation stumps - scar massage as demonstrated - Rx Gabapentin for pain PRN  - OHT for ROM and scar desensitization, referral given  - hand elevation  - all her questions were answered  - f/u 2 months with Dr. Arreaga.   12/17/2024 seen w daughters left hand amputation sites--third finegr fine, index w 2mm x 1mm eschar daily aquafor  has right distal finggrtip pain in index and fourth fingers  cont Hand OT  conatct info given for digital prosthesis--Arimed   12/27/24 : here for evaluation of right index finger pain/swelling  - continue keflex and bactrim as prescribed  - perform warm water soaks 3x daily  - avoid cutting nails, advised to use nail file instead.  - cont hand OT  - follow up x 2 months with Dr. Arreaga as scheduled, call for sooner appointment should any symptoms worsen.   2/27/2025 as above doing well s/p distal amp left 2+3 well healed mild fungal infection right 2+4  topical anmtifungal perscribed f/u 3 months  seen w both daughters    3/11/2025 pt called me last pm--daughter did--given keflex here in f/u given script for keflex 500 WID---I suggested change to TID  gives h/o trauma to right index finger  xray  f/u w PA in one week  3/18/25: osteomyelitis of right index finger as seen on xray.    - continue keflex for minimum 6 weeks.  - monitor for any worsening swelling, redness, pain, fevers.  - daughters very apprehensive about such a long course of antibiotics but explained the reasoning behind the treatment plan.   - follow up x 1 month with Dr. Arreaga

## 2025-03-18 NOTE — PHYSICAL EXAM
[de-identified] : Well developed, well nourished in NAD  [de-identified] : nonlabored breathing  [de-identified] : Right hand- warm with good ROM, ring finger amputation stump healed, no erythema or purulent drainage, hypersensitive scar.  Index finger with  mild TTP to distal phalanx, no swelling, open wounds or erythema.  no expressible drainage or active drainage noted.  good cap refill. full ROM.    Left hand - warm with good ROM, 2nd and 3rd digits with amputation stump at middle phalanx, healed, no erythema or purulence

## 2025-03-18 NOTE — HISTORY OF PRESENT ILLNESS
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 Being followed by , Dr. Gilliland and Vascular, . Performing LWC with betadine paint intermittently, cleaning with soap and water.  Pt now presents for evaluation of new right ring finger swelling, pain and mild drainage over the last 3 days. Pt had a later appt however was recommended to f/u asap after going to hand OT earlier this week. Pt denies any fever/chills or systemic symptoms. Reports RRF was spontaneously bleeding this week, denies purulent drainage.   Here with both daughters  Non smoker No h/o DM Not on any anticoagulation  Interval hx (4/16/24). Pt here for f/u accompanied by daughter. Doing well with improvement in healing. BL hand X-Ray with diffuse OA, no evidence of OM. Taking oral antibiotics, stopped 3 days ago due to GI discomfort. Denies any f/c or purulent drainage. Performing daily Betadine paint.   Interval hx (7/25/24). Pt here for f/u accompanied by daughter. No significant change. Offers no new complaints. Applying daily Betadine to ischemic digits. Denies any f/c or purulent drainage or gangrene.   Interval hx (9/6/24). Pt here for f/u to necrotic fingertips accompanied by daughter. Recently admitted to [facility] for UTI, on oral antibiotics for 2 more days. Noticed purulent drainage and increasing pain left middle fingertip for 2 days. Left index and right 4th finger dry gangrene stable. Denies any fever or chills.   Interval hx (9/25/24) : POD#7 s/p amputation of L index distal phalanx, L 3rd finger distal phalanx & R 4th finger distal phalanx. Complaining of pain mainly in the L hand, took tramadol post op with relief for 2 days. Denies fever or drainage from the dressings.  Interval hx (10/2/24): POD#14 s/p amputation of L index distal phalanx, L 3rd finger distal phalanx & R 4th finger distal phalanx. Complaining of pain to both fingers. Denies fever or drainage from the dressings  Interval hx (10/15/24): Pt presents today 4 weeks s/p distal amputation of L index, L middle & R 4th finger distal phalanx concerned with healing and pain. Completed all oral antibiotics. Denies any f/c or drainage.   Interval hx (10/29/24): Pt presents today 6 weeks s/p distal amputation of L index, L middle & R 4th finger distal phalanx. Doing very well overall but concerned with sensitive scar and discomfort.   Interval Hx (12/27/24): Patient presents with daughters for evaluation of right index finger pain/swelling that started on 12/22.  States she went to Urgent care and prescribed Bactrim DS and Keflex, no incision and drainage performed but with manual expression, pus was extracted.  Feels as though swelling and pain has improved, denies worsening erythema, fevers, chills or similar symptoms in other digits.  Has been keeping finger covered, not performing warm compresses or soaks.      Interval Hx (3/18/25): Pt here with daughters for follow up of right index finger infection.  Xray performed 3/12 revealed osteomyelitis of distal phalanx of index finger.  Remains on Keflex, taking TID.  Reports significantly improved pain, swelling and redness.  Denies any recurrent drainage or fevers.

## 2025-04-17 NOTE — ASSESSMENT
[FreeTextEntry1] : 85 y/o Setswana-speaking female here for evaluation of bilateral hand pressor induced digital gangrene, improving and demarcating dry gangrene. Patient and daughter agreed to amputation   Now 6 weeks s/p amputation of L index distal phalanx, L 3rd finger distal phalanx & R 4th finger distal phalanx. Doing well.   - daily Aquaphor to all amputation stumps - scar massage as demonstrated - Rx Gabapentin for pain PRN  - OHT for ROM and scar desensitization, referral given  - hand elevation  - all her questions were answered  - f/u 2 months with Dr. Arreaga.   12/17/2024 seen w daughters left hand amputation sites--third finegr fine, index w 2mm x 1mm eschar daily aquafor  has right distal finggrtip pain in index and fourth fingers  cont Hand OT  conatct info given for digital prosthesis--Arimed   12/27/24 : here for evaluation of right index finger pain/swelling  - continue keflex and bactrim as prescribed  - perform warm water soaks 3x daily  - avoid cutting nails, advised to use nail file instead.  - cont hand OT  - follow up x 2 months with Dr. Arreaga as scheduled, call for sooner appointment should any symptoms worsen.   2/27/2025 as above doing well s/p distal amp left 2+3 well healed mild fungal infection right 2+4  topical anmtifungal perscribed f/u 3 months  seen w both daughters    3/11/2025 pt called me last pm--daughter did--given keflex here in f/u given script for keflex 500 WID---I suggested change to TID  gives h/o trauma to right index finger  xray  f/u w PA in one week  3/18/25: osteomyelitis of right index finger as seen on xray.    - continue keflex for minimum 6 weeks.  - monitor for any worsening swelling, redness, pain, fevers.  - daughters very apprehensive about such a long course of antibiotics but explained the reasoning behind the treatment plan.   - follow up x 1 month with Dr. Arreaga   4/17/2025 as above seen w marzena Sue acted as  right index and fourth fingers  better finish abx course (two more days)  repeat xray  100 neurontin qhs for mild phantom pain on left 2+3 amp sites (healed well, no issues)  pt's daughter will call after obtaining xray  all ?s asnwered  discussed possibility of needing surgery to tx OM of digits (distal amp)  family understands

## 2025-04-17 NOTE — HISTORY OF PRESENT ILLNESS
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 Being followed by , Dr. Gilliland and Vascular, . Performing LWC with betadine paint intermittently, cleaning with soap and water.  Pt now presents for evaluation of new right ring finger swelling, pain and mild drainage over the last 3 days. Pt had a later appt however was recommended to f/u asap after going to hand OT earlier this week. Pt denies any fever/chills or systemic symptoms. Reports RRF was spontaneously bleeding this week, denies purulent drainage.   Here with both daughters  Non smoker No h/o DM Not on any anticoagulation  Interval hx (4/16/24). Pt here for f/u accompanied by daughter. Doing well with improvement in healing. BL hand X-Ray with diffuse OA, no evidence of OM. Taking oral antibiotics, stopped 3 days ago due to GI discomfort. Denies any f/c or purulent drainage. Performing daily Betadine paint.   Interval hx (7/25/24). Pt here for f/u accompanied by daughter. No significant change. Offers no new complaints. Applying daily Betadine to ischemic digits. Denies any f/c or purulent drainage or gangrene.   Interval hx (9/6/24). Pt here for f/u to necrotic fingertips accompanied by daughter. Recently admitted to [facility] for UTI, on oral antibiotics for 2 more days. Noticed purulent drainage and increasing pain left middle fingertip for 2 days. Left index and right 4th finger dry gangrene stable. Denies any fever or chills.   Interval hx (9/25/24) : POD#7 s/p amputation of L index distal phalanx, L 3rd finger distal phalanx & R 4th finger distal phalanx. Complaining of pain mainly in the L hand, took tramadol post op with relief for 2 days. Denies fever or drainage from the dressings.  Interval hx (10/2/24): POD#14 s/p amputation of L index distal phalanx, L 3rd finger distal phalanx & R 4th finger distal phalanx. Complaining of pain to both fingers. Denies fever or drainage from the dressings  Interval hx (10/15/24): Pt presents today 4 weeks s/p distal amputation of L index, L middle & R 4th finger distal phalanx concerned with healing and pain. Completed all oral antibiotics. Denies any f/c or drainage.   Interval hx (10/29/24): Pt presents today 6 weeks s/p distal amputation of L index, L middle & R 4th finger distal phalanx. Doing very well overall but concerned with sensitive scar and discomfort.   Interval Hx (12/27/24): Patient presents with daughters for evaluation of right index finger pain/swelling that started on 12/22.  States she went to Urgent care and prescribed Bactrim DS and Keflex, no incision and drainage performed but with manual expression, pus was extracted.  Feels as though swelling and pain has improved, denies worsening erythema, fevers, chills or similar symptoms in other digits.  Has been keeping finger covered, not performing warm compresses or soaks.      Interval Hx (3/18/25): Pt here with daughters for follow up of right index finger infection.  Xray performed 3/12 revealed osteomyelitis of distal phalanx of index finger.  Remains on Keflex, taking TID.  Reports significantly improved pain, swelling and redness.  Denies any recurrent drainage or fevers.

## 2025-04-17 NOTE — PHYSICAL EXAM
[de-identified] : Well developed, well nourished in NAD  [de-identified] : nonlabored breathing  [de-identified] : Right hand- warm with good ROM, ring finger amputation stump healed, no erythema or purulent drainage, hypersensitive scar.  Index finger with  mild TTP to distal phalanx, no swelling, open wounds or erythema.  no expressible drainage or active drainage noted.  good cap refill. full ROM.    Left hand - warm with good ROM, 2nd and 3rd digits with amputation stump at middle phalanx, healed, no erythema or purulence

## 2025-06-06 NOTE — ASSESSMENT
[FreeTextEntry1] : 85 y/o Slovenian-speaking female here for evaluation of bilateral hand pressor induced digital gangrene, improving and demarcating dry gangrene. Patient and daughter agreed to amputation   Now 6 weeks s/p amputation of L index distal phalanx, L 3rd finger distal phalanx & R 4th finger distal phalanx. Doing well.   - daily Aquaphor to all amputation stumps - scar massage as demonstrated - Rx Gabapentin for pain PRN  - OHT for ROM and scar desensitization, referral given  - hand elevation  - all her questions were answered  - f/u 2 months with Dr. Arreaga.   12/17/2024 seen w daughters left hand amputation sites--third finegr fine, index w 2mm x 1mm eschar daily aquafor  has right distal finggrtip pain in index and fourth fingers  cont Hand OT  conatct info given for digital prosthesis--Arimed   12/27/24 : here for evaluation of right index finger pain/swelling  - continue keflex and bactrim as prescribed  - perform warm water soaks 3x daily  - avoid cutting nails, advised to use nail file instead.  - cont hand OT  - follow up x 2 months with Dr. Arreaga as scheduled, call for sooner appointment should any symptoms worsen.   2/27/2025 as above doing well s/p distal amp left 2+3 well healed mild fungal infection right 2+4  topical anmtifungal perscribed f/u 3 months  seen w both daughters    3/11/2025 pt called me last pm--daughter did--given keflex here in f/u given script for keflex 500 WID---I suggested change to TID  gives h/o trauma to right index finger  xray  f/u w PA in one week  3/18/25: osteomyelitis of right index finger as seen on xray.    - continue keflex for minimum 6 weeks.  - monitor for any worsening swelling, redness, pain, fevers.  - daughters very apprehensive about such a long course of antibiotics but explained the reasoning behind the treatment plan.   - follow up x 1 month with Dr. Arreaga   4/17/2025 as above seen w marzena Sue acted as  right index and fourth fingers  better finish abx course (two more days)  repeat xray  100 neurontin qhs for mild phantom pain on left 2+3 amp sites (healed well, no issues)  pt's daughter will call after obtaining xray  all ?s asnwered  discussed possibility of needing surgery to tx OM of digits (distal amp)  family understands  6/6/25: here for follow up, currently on cipro  - continue cipro x 1 month total - repeat xray after antibiotics complete  - follow up with ID Dr. Wilburn -- info provided  - reassurance provided and all questions answered  - follow up x 1 month after xray complete  Discussed with Dr. Arreaga who agrees with above.

## 2025-06-06 NOTE — PHYSICAL EXAM
[de-identified] : Well developed, well nourished in NAD  [de-identified] : nonlabored breathing  [de-identified] : Right hand- warm with good ROM, ring finger amputation stump healed, no erythema or purulent drainage, hypersensitive scar.  Index finger without any tenderness, no swelling, open wounds or erythema.  no expressible drainage or active drainage noted.  good cap refill. full ROM.    Left hand - warm with good ROM, 2nd and 3rd digits with amputation stump at middle phalanx, healed, no erythema or purulence

## 2025-06-06 NOTE — HISTORY OF PRESENT ILLNESS
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 Being followed by , Dr. Gilliland and Vascular, . Performing LWC with betadine paint intermittently, cleaning with soap and water.  Pt now presents for evaluation of new right ring finger swelling, pain and mild drainage over the last 3 days. Pt had a later appt however was recommended to f/u asap after going to hand OT earlier this week. Pt denies any fever/chills or systemic symptoms. Reports RRF was spontaneously bleeding this week, denies purulent drainage.   Here with both daughters  Non smoker No h/o DM Not on any anticoagulation  Interval hx (4/16/24). Pt here for f/u accompanied by daughter. Doing well with improvement in healing. BL hand X-Ray with diffuse OA, no evidence of OM. Taking oral antibiotics, stopped 3 days ago due to GI discomfort. Denies any f/c or purulent drainage. Performing daily Betadine paint.   Interval hx (7/25/24). Pt here for f/u accompanied by daughter. No significant change. Offers no new complaints. Applying daily Betadine to ischemic digits. Denies any f/c or purulent drainage or gangrene.   Interval hx (9/6/24). Pt here for f/u to necrotic fingertips accompanied by daughter. Recently admitted to [facility] for UTI, on oral antibiotics for 2 more days. Noticed purulent drainage and increasing pain left middle fingertip for 2 days. Left index and right 4th finger dry gangrene stable. Denies any fever or chills.   Interval hx (9/25/24) : POD#7 s/p amputation of L index distal phalanx, L 3rd finger distal phalanx & R 4th finger distal phalanx. Complaining of pain mainly in the L hand, took tramadol post op with relief for 2 days. Denies fever or drainage from the dressings.  Interval hx (10/2/24): POD#14 s/p amputation of L index distal phalanx, L 3rd finger distal phalanx & R 4th finger distal phalanx. Complaining of pain to both fingers. Denies fever or drainage from the dressings  Interval hx (10/15/24): Pt presents today 4 weeks s/p distal amputation of L index, L middle & R 4th finger distal phalanx concerned with healing and pain. Completed all oral antibiotics. Denies any f/c or drainage.   Interval hx (10/29/24): Pt presents today 6 weeks s/p distal amputation of L index, L middle & R 4th finger distal phalanx. Doing very well overall but concerned with sensitive scar and discomfort.   Interval Hx (12/27/24): Patient presents with daughters for evaluation of right index finger pain/swelling that started on 12/22.  States she went to Urgent care and prescribed Bactrim DS and Keflex, no incision and drainage performed but with manual expression, pus was extracted.  Feels as though swelling and pain has improved, denies worsening erythema, fevers, chills or similar symptoms in other digits.  Has been keeping finger covered, not performing warm compresses or soaks.      Interval Hx (3/18/25): Pt here with daughters for follow up of right index finger infection.  Xray performed 3/12 revealed osteomyelitis of distal phalanx of index finger.  Remains on Keflex, taking TID.  Reports significantly improved pain, swelling and redness.  Denies any recurrent drainage or fevers.     Interval hx (6/6/25): Pt here with her daughters for follow up of right index finger infection -- restarted on cipro by Dr. Arreaga on 5/29 after review of xray that revealed worsening osteomyelitis of digit.  Reports improved pain, swelling and redness; no fevers or drainage.

## 2025-07-18 NOTE — PHYSICAL EXAM
[de-identified] : Well developed, well nourished in NAD  [de-identified] : nonlabored breathing  [de-identified] : Right hand- warm with good ROM, ring finger amputation stump healed, no erythema or purulent drainage, hypersensitive scar.  Index finger without any tenderness, no swelling, open wounds or erythema.  no expressible drainage or active drainage noted.  good cap refill. full ROM.    Left hand - warm with good ROM, 2nd and 3rd digits with amputation stump at middle phalanx, healed, no erythema or purulence

## 2025-07-18 NOTE — ASSESSMENT
[FreeTextEntry1] : 83 y/o Nepali-speaking female here for evaluation of bilateral hand pressor induced digital gangrene, improving and demarcating dry gangrene. Patient and daughter agreed to amputation   Now 6 weeks s/p amputation of L index distal phalanx, L 3rd finger distal phalanx & R 4th finger distal phalanx. Doing well.   - daily Aquaphor to all amputation stumps - scar massage as demonstrated - Rx Gabapentin for pain PRN  - OHT for ROM and scar desensitization, referral given  - hand elevation  - all her questions were answered  - f/u 2 months with Dr. Arreaga.   12/17/2024 seen w daughters left hand amputation sites--third finegr fine, index w 2mm x 1mm eschar daily aquafor  has right distal finggrtip pain in index and fourth fingers  cont Hand OT  conatct info given for digital prosthesis--Arimed   12/27/24 : here for evaluation of right index finger pain/swelling  - continue keflex and bactrim as prescribed  - perform warm water soaks 3x daily  - avoid cutting nails, advised to use nail file instead.  - cont hand OT  - follow up x 2 months with Dr. Arreaga as scheduled, call for sooner appointment should any symptoms worsen.   2/27/2025 as above doing well s/p distal amp left 2+3 well healed mild fungal infection right 2+4  topical anmtifungal perscribed f/u 3 months  seen w both daughters    3/11/2025 pt called me last pm--daughter did--given keflex here in f/u given script for keflex 500 WID---I suggested change to TID  gives h/o trauma to right index finger  xray  f/u w PA in one week  3/18/25: osteomyelitis of right index finger as seen on xray.    - continue keflex for minimum 6 weeks.  - monitor for any worsening swelling, redness, pain, fevers.  - daughters very apprehensive about such a long course of antibiotics but explained the reasoning behind the treatment plan.   - follow up x 1 month with Dr. Arreaga   4/17/2025 as above seen w marzena Sue acted as  right index and fourth fingers  better finish abx course (two more days)  repeat xray  100 neurontin qhs for mild phantom pain on left 2+3 amp sites (healed well, no issues)  pt's daughter will call after obtaining xray  all ?s asnwered  discussed possibility of needing surgery to tx OM of digits (distal amp)  family understands  6/6/25: here for follow up, currently on cipro  - continue cipro x 1 month total - repeat xray after antibiotics complete  - follow up with ID Dr. Wilburn -- info provided  - reassurance provided and all questions answered  - follow up x 1 month after xray complete  Discussed with Dr. Arreaga who agrees with above.   7/18/2025 seen w both daughter finished  finished abx in June no issues  saw Dr Wilburn--no abx or abx indicated I agree left hand ROm very good but could be improved w Hand OT (script given) rigth hand fine--no complaints  no f/u indicated unless scenario changes daughters know they can always call if any concerns  >20 min on interview  possible B/L CTS (R>L) B/L thenar atrophy   diminished sensation median nerve distribution will send for EMG  pt's family to call after EMg to discuss results

## 2025-07-18 NOTE — HISTORY OF PRESENT ILLNESS
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 Being followed by , Dr. Gilliland and Vascular, . Performing LWC with betadine paint intermittently, cleaning with soap and water.  Pt now presents for evaluation of new right ring finger swelling, pain and mild drainage over the last 3 days. Pt had a later appt however was recommended to f/u asap after going to hand OT earlier this week. Pt denies any fever/chills or systemic symptoms. Reports RRF was spontaneously bleeding this week, denies purulent drainage.   Here with both daughters  Non smoker No h/o DM Not on any anticoagulation  Interval hx (4/16/24). Pt here for f/u accompanied by daughter. Doing well with improvement in healing. BL hand X-Ray with diffuse OA, no evidence of OM. Taking oral antibiotics, stopped 3 days ago due to GI discomfort. Denies any f/c or purulent drainage. Performing daily Betadine paint.   Interval hx (7/25/24). Pt here for f/u accompanied by daughter. No significant change. Offers no new complaints. Applying daily Betadine to ischemic digits. Denies any f/c or purulent drainage or gangrene.   Interval hx (9/6/24). Pt here for f/u to necrotic fingertips accompanied by daughter. Recently admitted to [facility] for UTI, on oral antibiotics for 2 more days. Noticed purulent drainage and increasing pain left middle fingertip for 2 days. Left index and right 4th finger dry gangrene stable. Denies any fever or chills.   Interval hx (9/25/24) : POD#7 s/p amputation of L index distal phalanx, L 3rd finger distal phalanx & R 4th finger distal phalanx. Complaining of pain mainly in the L hand, took tramadol post op with relief for 2 days. Denies fever or drainage from the dressings.  Interval hx (10/2/24): POD#14 s/p amputation of L index distal phalanx, L 3rd finger distal phalanx & R 4th finger distal phalanx. Complaining of pain to both fingers. Denies fever or drainage from the dressings  Interval hx (10/15/24): Pt presents today 4 weeks s/p distal amputation of L index, L middle & R 4th finger distal phalanx concerned with healing and pain. Completed all oral antibiotics. Denies any f/c or drainage.   Interval hx (10/29/24): Pt presents today 6 weeks s/p distal amputation of L index, L middle & R 4th finger distal phalanx. Doing very well overall but concerned with sensitive scar and discomfort.   Interval Hx (12/27/24): Patient presents with daughters for evaluation of right index finger pain/swelling that started on 12/22.  States she went to Urgent care and prescribed Bactrim DS and Keflex, no incision and drainage performed but with manual expression, pus was extracted.  Feels as though swelling and pain has improved, denies worsening erythema, fevers, chills or similar symptoms in other digits.  Has been keeping finger covered, not performing warm compresses or soaks.      Interval Hx (3/18/25): Pt here with daughters for follow up of right index finger infection.  Xray performed 3/12 revealed osteomyelitis of distal phalanx of index finger.  Remains on Keflex, taking TID.  Reports significantly improved pain, swelling and redness.  Denies any recurrent drainage or fevers.     Interval hx (6/6/25): Pt here with her daughters for follow up of right index finger infection -- restarted on cipro by Dr. Arreaga on 5/29 after review of xray that revealed worsening osteomyelitis of digit.  Reports improved pain, swelling and redness; no fevers or drainage.